# Patient Record
Sex: FEMALE | NOT HISPANIC OR LATINO | URBAN - METROPOLITAN AREA
[De-identification: names, ages, dates, MRNs, and addresses within clinical notes are randomized per-mention and may not be internally consistent; named-entity substitution may affect disease eponyms.]

---

## 2017-05-04 PROBLEM — C72.30: Status: ACTIVE | Noted: 2017-05-04

## 2022-08-16 ENCOUNTER — OFFICE VISIT (OUTPATIENT)
Dept: PEDIATRIC HEMATOLOGY/ONCOLOGY | Facility: CLINIC | Age: 37
End: 2022-08-16
Attending: PEDIATRICS
Payer: COMMERCIAL

## 2022-08-16 VITALS
WEIGHT: 138.89 LBS | TEMPERATURE: 99 F | HEART RATE: 85 BPM | SYSTOLIC BLOOD PRESSURE: 140 MMHG | OXYGEN SATURATION: 100 % | RESPIRATION RATE: 18 BRPM | DIASTOLIC BLOOD PRESSURE: 82 MMHG | HEIGHT: 62 IN | BODY MASS INDEX: 25.56 KG/M2

## 2022-08-16 DIAGNOSIS — Q85.01 NEUROFIBROMATOSIS, TYPE 1 (H): Primary | ICD-10-CM

## 2022-08-16 DIAGNOSIS — C72.30 BENIGN OPTIC PATHWAY GLIOMA (H): ICD-10-CM

## 2022-08-16 PROCEDURE — G0463 HOSPITAL OUTPT CLINIC VISIT: HCPCS

## 2022-08-16 PROCEDURE — 99204 OFFICE O/P NEW MOD 45 MIN: CPT | Performed by: PEDIATRICS

## 2022-08-16 RX ORDER — LABETALOL 100 MG/1
100 TABLET, FILM COATED ORAL 2 TIMES DAILY
COMMUNITY
Start: 2022-02-10

## 2022-08-16 ASSESSMENT — ENCOUNTER SYMPTOMS
RESPIRATORY NEGATIVE: 1
NEUROLOGICAL NEGATIVE: 1
CONSTIPATION: 1
MUSCULOSKELETAL NEGATIVE: 1
CONSTITUTIONAL NEGATIVE: 1
HEARTBURN: 1
CARDIOVASCULAR NEGATIVE: 1
EYES NEGATIVE: 1
PSYCHIATRIC NEGATIVE: 1

## 2022-08-16 ASSESSMENT — PAIN SCALES - GENERAL
PAINLEVEL: NO PAIN (0)
PAINLEVEL: NO PAIN (0)

## 2022-08-16 NOTE — PATIENT INSTRUCTIONS
Follow Up:  Return in March or 2023 for MRI and visit with Dr. Guerra  Call and schedule mammogram     Thank you for choosing [facility].    It was a pleasure to see you today.            Neurofibromatosis Team  Aquilino Guerra MD - Director, Neurofibromatosis/Pediatric Neuro-Oncology  Mari Fernandez MD - Pediatric Genetics    Lorenza Mejia, CNP, APRN  Jia Maki RN - NF Care Coordinator  Phone: [phone]  E-mail: [email or web address], [email or web address] Floor - [place] [id number] [facility] [place] [address]  Scheduling/Appointments: [phone]  Fax: [phone]     Numbers to call:   Monday - Friday, 8:00 am - 5:00 pm:  RN phone/voicemail: [phone]  Scheduling/Appointments: [phone]  Nights and weekends:   Call [phone] and ask the  to page the 'Pediatric Heme/Onc fellow on call' if you have an urgent concern that can't wait until the clinic opens.     Scheduling:    [facility], 9th floor [phone]  Infusion Center/Lab: [phone]   Radiology/ [phone]      Services:   [phone]         We encourage you to sign up for [employer] for easy communication with us.  Sign up at the clinic  or go to [email or web address].      Please try the Passport to [facility] ([facility]) phone application for Virtual Tours, Procedure Preparation, Resources, Preparation for Hospital Stay and the Coloring Board.     Other Instructions:  Ophthalmology (eye MD) exam every year  Annual physical with your Primary Care Provider  Influenza vaccine every year in the fall  Use Broad-Spectrum sunscreen SPF 15-30 from April through September  Full skin exam by dermatologist every 1-2 years.

## 2022-08-16 NOTE — PROGRESS NOTES
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 At that visit, the patient presented for evaluation of her NF1 before trying to get pregnant for a second time.     Today, the patient presents with her . She had a nodule removed from her face by Dr. Velazquez and is very happy with the results. The patient is taking Labetalol for her high blood pressure. Denies any other medications besides a daily probiotic. Of note, the patient had COVID-19 in December 2021 with long-lasting anosmia. This is improving with time. Denies any headaches, now or when her blood pressure was higher. The patient has noticed a few neurofibromas that have grown over the years but that they are mostly stable at this point in time.     Fam/soc: She is  with a 9 year old son.     The patient had a recent brain MRI on 3/6/22 at [facility]  IMPRESSION:  Brain:   1. Small 5 mm enhancing lesion with mild surrounding T2 hyperintensity in the left frontal white matter just anterior to the genu of the corpus callosum. Given the patient's history of NF1, this could represent a developing neoplasm. Recommend follow-up in 2-3 months.   2. Multiple scattered nonenhancing T2 hyperintense lesions within the brain parenchyma as above, likely secondary to the patient's history of NF1 and focal abnormal signal intensities.   Orbits:   1. Mild nonenhancing fusiform enlargement of the prechiasmatic right optic nerve, given the history of NF1 is suspicious for an optic nerve glioma.   2. There may be some atrophy of the posterior left optic nerve most pronounced along the posterior intraorbital and canalicular segments.           Review of Systems   Constitutional: Negative.    HENT: Negative.         Swallowing is sometimes difficult.    Eyes: Negative.    Respiratory: Negative.    Cardiovascular: Negative.    Gastrointestinal: Positive for constipation and heartburn.  "       Occasional heartburn after eating fatty foods. Occasional constipation, somewhat alleviated with probiotic.    Genitourinary: Negative.    Musculoskeletal: Negative.    Skin: Negative.    Neurological: Negative.    Endo/Heme/Allergies:        3-4 heavy days during her menses each month.    Psychiatric/Behavioral: Negative.    All other systems reviewed and are negative.      BP (!) 153/103 (BP Location: Left arm, Patient Position: Sitting, Cuff Size: Adult Regular)   Pulse 85   Temp 99  F (37.2  C) (Oral)   Resp 18   Ht 1.584 m (5' 2.36\")   Wt 63 kg (138 lb 14.2 oz)   SpO2 100%   BMI 25.11 kg/m      Physical Exam  Vitals reviewed.   Constitutional:       Appearance: Normal appearance. She is normal weight.   HENT:      Head: Normocephalic and atraumatic.      Right Ear: External ear normal.      Left Ear: External ear normal.      Nose: Nose normal.      Mouth/Throat:      Mouth: Mucous membranes are moist.      Pharynx: Oropharynx is clear.   Eyes:      Extraocular Movements: Extraocular movements intact.      Conjunctiva/sclera: Conjunctivae normal.      Pupils: Pupils are equal, round, and reactive to light.   Cardiovascular:      Rate and Rhythm: Normal rate and regular rhythm.      Pulses: Normal pulses.      Heart sounds: Normal heart sounds.   Pulmonary:      Effort: Pulmonary effort is normal.      Breath sounds: Normal breath sounds.   Abdominal:      General: Abdomen is flat. Bowel sounds are normal.      Palpations: Abdomen is soft.   Musculoskeletal:         General: Normal range of motion.      Cervical back: Normal range of motion and neck supple.   Skin:     General: Skin is warm and dry.      Comments: Cutaneous neurofibromas visualized throughout.   Neurological:      General: No focal deficit present.      Mental Status: She is alert and oriented to person, place, and time.   Psychiatric:         Mood and Affect: Mood normal.         Behavior: Behavior normal.         Thought Content: " Thought content normal.         Judgment: Judgment normal.         Impression:  1. NF1   2. High blood pressure  3. 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 Start taking vitamin D    2. Advised use of olive oil over butter in diet    3. Schedule a mammogram due to increased risk of breast cancer with NF1    4. Advised patient to be aware of headaches due to the possibility to blood vessel narrowing in the head. Also advised the patient to be aware of new pain, any new lump/bump that is growing quickly, and any pain that wakes her up from sleep.      F/U in March 2023 with an updated head MRI for follow up for her low grade glioma.     Total time spent on the following services on the date of the encounter:  Preparing to see patient, chart review, review of outside records, Referring or communicating with other healthcare professionals, Interpretation of labs, imaging and other tests, Performing a medically appropriate examination , Documenting clinical information in the electronic or other health record  and Total time spent: 46 minutes    BASHIR NEGRETE, am working as a scribe for and in the presence of Dr. Guerra on August 16, 2022. Dr. Guerra performed the services described in this note and the note is both complete and accurate.     Aquilino Guerra MD

## 2022-08-16 NOTE — NURSING NOTE
"Chief Complaint   Patient presents with     New Patient     Pt here for NF1     BP (!) 140/82 (BP Location: Left arm, Patient Position: Sitting, Cuff Size: Adult Regular)   Pulse 85   Temp 99  F (37.2  C) (Oral)   Resp 18   Ht 1.584 m (5' 2.36\")   Wt 63 kg (138 lb 14.2 oz)   SpO2 100%   BMI 25.11 kg/m      No Pain (0)  Data Unavailable    I have reviewed the patients medications and allergies    Height/weight double check needed? No    Peds Outpatient BP  1) Rested for 5 minutes, BP taken on bare arm, patient sitting (or supine for infants) w/ legs uncrossed?   Yes  2) Right arm used?  Left arm   No- Patient requested left arm  3) Arm circumference of largest part of upper arm (in cm): 27cm  4) BP cuff sized used: Adult (25-32cm)   If used different size cuff then what was recommended why? N/A  5) First BP reading:machine   BP Readings from Last 1 Encounters:   22 (!) 140/82      Is reading >90%?Yes   (90% for <1 years is 90/50)  (90% for >18 years is 140/90)  *If a machine BP is at or above 90% take manual BP  6) Manual BP readin/102 machine, 140/82 manual  7) Other comments: None          Rufus Melvin, EMT  2022  "

## 2022-08-16 NOTE — LETTER
8/16/2022      RE: Vika Jaquez  [address] [address]     Dear Colleague,    Thank you for the opportunity to participate in the care of your patient, Vika Jaquez, at the [facility] at [facility]. Please see a copy of my visit note below.    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 At that visit, the patient presented for evaluation of her NF1 before trying to get pregnant for a second time.     Today, the patient presents with her . She had a nodule removed from her face by Dr. Velazquez and is very happy with the results. The patient is taking Labetalol for her high blood pressure. Denies any other medications besides a daily probiotic. Of note, the patient had COVID-19 in December 2021 with long-lasting anosmia. This is improving with time. Denies any headaches, now or when her blood pressure was higher. The patient has noticed a few neurofibromas that have grown over the years but that they are mostly stable at this point in time.     Fam/soc: She is  with a 9 year old son.     The patient had a recent brain MRI on 3/6/22 at [facility]  IMPRESSION:  Brain:   1. Small 5 mm enhancing lesion with mild surrounding T2 hyperintensity in the left frontal white matter just anterior to the genu of the corpus callosum. Given the patient's history of NF1, this could represent a developing neoplasm. Recommend follow-up in 2-3 months.   2. Multiple scattered nonenhancing T2 hyperintense lesions within the brain parenchyma as above, likely secondary to the patient's history of NF1 and focal abnormal signal intensities.   Orbits:   1. Mild nonenhancing fusiform enlargement of the prechiasmatic right optic nerve, given the history of NF1 is suspicious for an optic nerve glioma.   2. There may be some  Problem: Diabetes  Goal: Glycemic balance achieved/maintained  Goal is to maintain blood sugar within range with no episodes of hypoglycemia   Outcome: Outcome Not Met, Plan Adjusted  Blood sugar control with lantus and sliding scale insulins. Sliding scale adjusted overnight per eICU for more adequate coverage with blood sugars remaining >200    Problem: Fluid Volume Excess, Risk for  Goal: # Absence of Rapid Weight Gain (no more than 2kg in 24 hours)  FVE Risk Patients may gain weight (but not more than 2 kg / 5#) but may not require aggressive treatment if in the absence of dyspnea; FVE (actual) patients should be monitored to achieve no weight gain.    Outcome: Outcome Met, Continue evaluating goal progress toward completion  Patient tolerating SLED for fluid removal. Patient is compliant with fluid restriction.       "atrophy of the posterior left optic nerve most pronounced along the posterior intraorbital and canalicular segments.           Review of Systems   Constitutional: Negative.    HENT: Negative.         Swallowing is sometimes difficult.    Eyes: Negative.    Respiratory: Negative.    Cardiovascular: Negative.    Gastrointestinal: Positive for constipation and heartburn.        Occasional heartburn after eating fatty foods. Occasional constipation, somewhat alleviated with probiotic.    Genitourinary: Negative.    Musculoskeletal: Negative.    Skin: Negative.    Neurological: Negative.    Endo/Heme/Allergies:        3-4 heavy days during her menses each month.    Psychiatric/Behavioral: Negative.    All other systems reviewed and are negative.      BP (!) 153/103 (BP Location: Left arm, Patient Position: Sitting, Cuff Size: Adult Regular)   Pulse 85   Temp 99  F (37.2  C) (Oral)   Resp 18   Ht 1.584 m (5' 2.36\")   Wt 63 kg (138 lb 14.2 oz)   SpO2 100%   BMI 25.11 kg/m      Physical Exam  Vitals reviewed.   Constitutional:       Appearance: Normal appearance. She is normal weight.   HENT:      Head: Normocephalic and atraumatic.      Right Ear: External ear normal.      Left Ear: External ear normal.      Nose: Nose normal.      Mouth/Throat:      Mouth: Mucous membranes are moist.      Pharynx: Oropharynx is clear.   Eyes:      Extraocular Movements: Extraocular movements intact.      Conjunctiva/sclera: Conjunctivae normal.      Pupils: Pupils are equal, round, and reactive to light.   Cardiovascular:      Rate and Rhythm: Normal rate and regular rhythm.      Pulses: Normal pulses.      Heart sounds: Normal heart sounds.   Pulmonary:      Effort: Pulmonary effort is normal.      Breath sounds: Normal breath sounds.   Abdominal:      General: Abdomen is flat. Bowel sounds are normal.      Palpations: Abdomen is soft.   Musculoskeletal:         General: Normal range of motion.      Cervical back: Normal range of " motion and neck supple.   Skin:     General: Skin is warm and dry.      Comments: Cutaneous neurofibromas visualized throughout.   Neurological:      General: No focal deficit present.      Mental Status: She is alert and oriented to person, place, and time.   Psychiatric:         Mood and Affect: Mood normal.         Behavior: Behavior normal.         Thought Content: Thought content normal.         Judgment: Judgment normal.         Impression:  1. NF1   2. High blood pressure  3. 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 Start taking vitamin D    2. Advised use of olive oil over butter in diet    3. Schedule a mammogram due to increased risk of breast cancer with NF1    4. Advised patient to be aware of headaches due to the possibility to blood vessel narrowing in the head. Also advised the patient to be aware of new pain, any new lump/bump that is growing quickly, and any pain that wakes her up from sleep.      F/U in March 2023 with an updated head MRI for follow up for her low grade glioma.     Total time spent on the following services on the date of the encounter:  Preparing to see patient, chart review, review of outside records, Referring or communicating with other healthcare professionals, Interpretation of labs, imaging and other tests, Performing a medically appropriate examination , Documenting clinical information in the electronic or other health record  and Total time spent: 46 minutes    BASHIR NEGRETE, am working as a scribe for and in the presence of Dr. Guerra on August 16, 2022. Dr. Guerra performed the services described in this note and the note is both complete and accurate.     Aquilino Guerra MD

## 2023-02-27 NOTE — PATIENT INSTRUCTIONS
Plan:  Return in 2 years to see Dr. Guerra       Thank you for choosing [facility].    It was a pleasure to see you today.            Neurofibromatosis Team  Aquilino Guerra MD - Director, Neurofibromatosis/Pediatric Neuro-Oncology  Mari Fernandez MD - Pediatric Genetics    Lorenza Mejia, ERLIN, APRN  Jia Maki RN - NF Care Coordinator  Phone: [phone]  E-mail: [email or web address], 9th Floor - [place] [id number] [place] [address]  Scheduling/Appointments: [phone]  Fax: [phone]     Numbers to call:   Monday - Friday, 8:00 am - 5:00 pm:  RN phone/voicemail: [phone]  Scheduling/Appointments: [phone]  Nights and weekends:   Call [phone] and ask the  to page the 'Pediatric Heme/Onc fellow on call' if you have an urgent concern that can't wait until the clinic opens.     Scheduling:    [facility], 9th floor [phone]  Infusion Center/Lab: [phone]   Radiology/ [phone]      Services:   [phone]         We encourage you to sign up for [employer] for easy communication with us.  Sign up at the clinic  or go to [email or web address].      Please try the Passport to [facility] ([facility]) phone application for Virtual Tours, Procedure Preparation, Resources, Preparation for Hospital Stay and the Coloring Board.     Other Instructions:  Ophthalmology (eye MD) exam every year  Annual physical with your Primary Care Provider  Influenza vaccine every year in the fall  Use Broad-Spectrum sunscreen SPF 15-30 from April through September  Full skin exam by dermatologist every 1-2 years.

## 2023-03-08 ENCOUNTER — HOSPITAL ENCOUNTER (OUTPATIENT)
Dept: MRI IMAGING | Facility: CLINIC | Age: 38
Discharge: HOME | End: 2023-03-08
Attending: PEDIATRICS
Payer: COMMERCIAL

## 2023-03-08 ENCOUNTER — OFFICE VISIT (OUTPATIENT)
Dept: PEDIATRIC HEMATOLOGY/ONCOLOGY | Facility: CLINIC | Age: 38
End: 2023-03-08
Attending: PEDIATRICS
Payer: COMMERCIAL

## 2023-03-08 VITALS
SYSTOLIC BLOOD PRESSURE: 120 MMHG | TEMPERATURE: 98.5 F | OXYGEN SATURATION: 97 % | WEIGHT: 137.57 LBS | DIASTOLIC BLOOD PRESSURE: 70 MMHG | HEIGHT: 62 IN | RESPIRATION RATE: 18 BRPM | BODY MASS INDEX: 25.32 KG/M2 | HEART RATE: 96 BPM

## 2023-03-08 DIAGNOSIS — C72.30 BENIGN OPTIC PATHWAY GLIOMA (H): ICD-10-CM

## 2023-03-08 DIAGNOSIS — Q85.01 NEUROFIBROMATOSIS, TYPE 1 (H): ICD-10-CM

## 2023-03-08 DIAGNOSIS — Q85.01 NEUROFIBROMATOSIS, TYPE 1 (H): Primary | ICD-10-CM

## 2023-03-08 PROCEDURE — G0463 HOSPITAL OUTPT CLINIC VISIT: HCPCS | Mod: 25 | Performed by: PEDIATRICS

## 2023-03-08 PROCEDURE — 255N000002 HC RX 255 OP 636: Performed by: PEDIATRICS

## 2023-03-08 PROCEDURE — A9585 GADOBUTROL INJECTION: HCPCS | Performed by: PEDIATRICS

## 2023-03-08 PROCEDURE — 70543 MRI ORBT/FAC/NCK W/O &W/DYE: CPT

## 2023-03-08 PROCEDURE — 99214 OFFICE O/P EST MOD 30 MIN: CPT | Mod: GC | Performed by: PEDIATRICS

## 2023-03-08 PROCEDURE — 70553 MRI BRAIN STEM W/O & W/DYE: CPT | Mod: 26 | Performed by: RADIOLOGY

## 2023-03-08 PROCEDURE — 70543 MRI ORBT/FAC/NCK W/O &W/DYE: CPT | Mod: 26 | Performed by: RADIOLOGY

## 2023-03-08 RX ORDER — GADOBUTROL 604.72 MG/ML
6.3 INJECTION INTRAVENOUS ONCE
Status: COMPLETED | OUTPATIENT
Start: 2023-03-08 | End: 2023-03-08

## 2023-03-08 RX ADMIN — GADOBUTROL 6.3 ML: 604.72 INJECTION INTRAVENOUS at 11:59

## 2023-03-08 ASSESSMENT — PAIN SCALES - GENERAL: PAINLEVEL: NO PAIN (0)

## 2023-03-08 ASSESSMENT — ENCOUNTER SYMPTOMS
EYES NEGATIVE: 1
CARDIOVASCULAR NEGATIVE: 1
RESPIRATORY NEGATIVE: 1
GASTROINTESTINAL NEGATIVE: 1
CONSTITUTIONAL NEGATIVE: 1
INSOMNIA: 1
MUSCULOSKELETAL NEGATIVE: 1
NEUROLOGICAL NEGATIVE: 1

## 2023-03-08 NOTE — DISCHARGE INSTRUCTIONS
MRI Contrast Discharge Instructions    The IV contrast you received today will pass out of your body in your  urine. This will happen in the next 24 hours. You will not feel this process.  Your urine will not change color.    Drink at least 4 extra glasses of water or juice today (unless your doctor  has restricted your fluids). This reduces the stress on your kidneys.  You may take your regular medicines.    If you are on dialysis: It is best to have dialysis today.    If you have a reaction: Most reactions happen right away. If you have  any new symptoms after leaving the hospital (such as hives or swelling),  call your hospital at the correct number below. Or call your family doctor.  If you have breathing distress or wheezing, call 911.    Special instructions: ***    I have read and understand the above information.    Signature:______________________________________ Date:___________    Staff:__________________________________________ Date:___________     Time:__________    [place] Radiology Departments:    ___[place]: [phone]  ___[facility]: [phone]  ___[place]: [phone] ___[facility]: [phone]  ___[facility]: [phone]  ___[facility]: [phone]  ___[phone]  ___[facility]: [phone]  ___[phone]

## 2023-03-08 NOTE — LETTER
3/8/2023      RE: Vika Jaquez  [address] [address]     Dear Colleague,    Thank you for the opportunity to participate in the care of your patient, Vika Jaquez, at the [facility] at [facility]. Please see a copy of my visit note below.    HPI  Vika Jaquez is a 38 year old year old with a history of NF1 who presents to the clinic for a follow up, last seen in our clinic on 8/16/22. Accompanied by her .    Patient reports that she has been doing well since the last visit, but notes that she wakes up frequently through out the night which leads to disruptive sleep. She has 2 cans of soda a day and does not drink alcohol. She believes that sleep disruption is related to stress related to work.    Patient had her last mammogram 6 months ago which came back normal.     Patient notes a soft, squishy nodule over her left thigh without pain or decrease in function.    Fam/soc: Patient works with pre-schoolers.     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 Although, she had cutaneous facial neurofibromas, she was not diagnosed until later after she had a nodule removed from her face that was found to be a neurofibroma. 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      Review of Systems   Constitutional: Negative.    HENT: Negative.    Eyes: Negative.    Respiratory: Negative.    Cardiovascular: Negative.    Gastrointestinal: Negative.    Genitourinary: Negative.    Musculoskeletal: Negative.    Skin: Negative.    Neurological: Negative.    Psychiatric/Behavioral: The patient has insomnia.         Wakes up multiple time throughout the night leading to disrupted sleep.   All other systems reviewed and are negative.      Objective  /70 (BP  "Location: Left arm, Patient Position: Sitting, Cuff Size: Adult Regular)   Pulse 96   Temp 98.5  F (36.9  C) (Oral)   Resp 18   Ht 1.582 m (5' 2.28\")   Wt 62.4 kg (137 lb 9.1 oz)   SpO2 97%   BMI 24.93 kg/m    Physical Exam  Vitals reviewed. Exam conducted with a chaperone present.   Constitutional:       Appearance: Normal appearance. She is normal weight.   HENT:      Head: Normocephalic and atraumatic.      Right Ear: External ear normal.      Left Ear: External ear normal.      Nose: Nose normal.      Mouth/Throat:      Mouth: Mucous membranes are moist.      Pharynx: Oropharynx is clear.   Eyes:      Extraocular Movements: Extraocular movements intact.      Pupils: Pupils are equal, round, and reactive to light.   Cardiovascular:      Rate and Rhythm: Normal rate and regular rhythm.      Pulses: Normal pulses.      Heart sounds: Normal heart sounds.   Pulmonary:      Effort: Pulmonary effort is normal.      Breath sounds: Normal breath sounds.   Musculoskeletal:         General: Normal range of motion.      Cervical back: Normal range of motion and neck supple.   Skin:     Comments: Stigmata of NF1. Soft sub dermal mass of the left upper shin.    Neurological:      General: No focal deficit present.      Mental Status: She is alert and oriented to person, place, and time. Mental status is at baseline.   Psychiatric:         Mood and Affect: Mood normal.         Behavior: Behavior normal.         Thought Content: Thought content normal.         Judgment: Judgment normal.       Results for orders placed or performed during the hospital encounter of 03/08/23   MRI Brain and orbits     Status: None    Narrative    EXAM: MR BRAIN AND ORBITS W CONTRAST  3/8/2023 12:16 PM     HISTORY:  Neurofibromatosis, type 1 (H); Benign optic pathway glioma  (H)       COMPARISON:  3/6/2022    TECHNIQUE: 1. MRI of the Brain:  Sagittal T1-weighted, axial  turboFLAIR and axial diffusion-weighted with ADC map images of " the  brain were obtained without intravenous contrast.  After intravenous  administration of gadolinium, axial T1-weighted images of the brain  were obtained.    2. MRI of the Orbits focused on the orbits/visual pathways:  Axial  T2-weighted with inversion recovery and coronal T1-weighted images  were obtained without intravenous contrast. Axial and coronal  T1-weighted images with fat saturation were obtained after intravenous  gadolinium administration.    CONTRAST: 6.3 mL of Louis.    FINDINGS:    Mild fusiform enlargement of the prechiasmatic right optic nerve  measuring up to 4 mm, previously 4 mm. This region does not enhance.  No new regions of focal enlargement of the optic nerves.     Scattered foci of T2/FLAIR hyperintensity in the left greater than  right basal ganglia, medial thalami, and brainstem which are not  significantly changed from prior exam. There is a small focus of T2  hyperintensity in the left frontal white matter immediately anterior  to the corpus callosum which displays enhancement. This measures 5 x 5  mm, previously 5 x 5 mm. No new foci of abnormal intracranial  enhancement. No abnormal restricted diffusion. No abnormal restricted  diffusion. Major intracranial vascular flow voids appear patent.    Visualized paranasal sinuses and mastoid air cells are clear.      Impression    IMPRESSION:    1. Unchanged scattered focal areas of signal intensity compared to  prior compatible with given history of neurofibromatosis type I.  2. Unchanged fusiform enlargement of the right prechiasmatic optic  nerve compatible with optic nerve glioma.  3. Unchanged small enhancing left frontal lobe lesion which may  represent small glioma. Continued attention on follow-up recommended.    I have personally reviewed the examination and initial interpretation  and I agree with the findings.    LEILANI PAREDES MD         SYSTEM ID:  [id number]      Impression:  NF1  No concerns on follow up MRI  Plexiform  neurofibroma left leg      Plan  1. Annual exam: Assess skin lesions, monitor for hypertension, evaluate for skeletal changes (at risk for vertebral body changes and osteoporosis)  2. This is a cancer predisposition syndrome; concerning findings should prompt evaluation. Continue annual mammography screening. Red flag for MPNST include rapid plexiform growth, significant pain, new neurologic deficit, and change to a hard texture  3. At risk for vasculopathy, elevated risk of stroke. Blood pressure today is normal.   4. Neurocognitive evaluation / assistance: N/A.  5. Follow up in 2 years    SignedSunil   Pediatric Neuro Oncology Fellow    Physician Attestation   I, Aquilino Guerra MD, saw this patient and agree with the findings and plan of care as documented in the note.      Items personally reviewed/procedural attestation: vitals and imaging and agree with the interpretation documented in the note.    Aquilino Guerra MD      Total time spent on the following services on the date of the encounter:  Preparing to see patient, chart review, review of outside records, Referring or communicating with other healthcare professionals, Interpretation of labs, imaging and other tests, Performing a medically appropriate examination , Documenting clinical information in the electronic or other health record  and Total time spent: 30 minutes    IReal, am working as a scribe for and in the presence of Dr. Guerra on March 8, 2023. Dr. Guerra performed the services described in this note and the note is both complete and accurate.     Aquilino Guerra MD

## 2023-03-08 NOTE — PROGRESS NOTES
"HPI  Vika Jaquez is a 38 year old year old with a history of NF1 who presents to the clinic for a follow up, last seen in our clinic on 8/16/22. Accompanied by her .    Patient reports that she has been doing well since the last visit, but notes that she wakes up frequently through out the night which leads to disruptive sleep. She has 2 cans of soda a day and does not drink alcohol. She believes that sleep disruption is related to stress related to work.    Patient had her last mammogram 6 months ago which came back normal.     Patient notes a soft, squishy nodule over her left thigh without pain or decrease in function.    Fam/soc: Patient works with pre-schoolers.     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 Although, she had cutaneous facial neurofibromas, she was not diagnosed until later after she had a nodule removed from her face that was found to be a neurofibroma. 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      Review of Systems   Constitutional: Negative.    HENT: Negative.    Eyes: Negative.    Respiratory: Negative.    Cardiovascular: Negative.    Gastrointestinal: Negative.    Genitourinary: Negative.    Musculoskeletal: Negative.    Skin: Negative.    Neurological: Negative.    Psychiatric/Behavioral: The patient has insomnia.         Wakes up multiple time throughout the night leading to disrupted sleep.   All other systems reviewed and are negative.      Objective  /70 (BP Location: Left arm, Patient Position: Sitting, Cuff Size: Adult Regular)   Pulse 96   Temp 98.5  F (36.9  C) (Oral)   Resp 18   Ht 1.582 m (5' 2.28\")   Wt 62.4 kg (137 lb 9.1 oz)   SpO2 97%   BMI 24.93 kg/m    Physical Exam  Vitals reviewed. Exam conducted with a chaperone present.   Constitutional:       Appearance: Normal appearance. She is normal weight. "   HENT:      Head: Normocephalic and atraumatic.      Right Ear: External ear normal.      Left Ear: External ear normal.      Nose: Nose normal.      Mouth/Throat:      Mouth: Mucous membranes are moist.      Pharynx: Oropharynx is clear.   Eyes:      Extraocular Movements: Extraocular movements intact.      Pupils: Pupils are equal, round, and reactive to light.   Cardiovascular:      Rate and Rhythm: Normal rate and regular rhythm.      Pulses: Normal pulses.      Heart sounds: Normal heart sounds.   Pulmonary:      Effort: Pulmonary effort is normal.      Breath sounds: Normal breath sounds.   Musculoskeletal:         General: Normal range of motion.      Cervical back: Normal range of motion and neck supple.   Skin:     Comments: Stigmata of NF1. Soft sub dermal mass of the left upper shin.    Neurological:      General: No focal deficit present.      Mental Status: She is alert and oriented to person, place, and time. Mental status is at baseline.   Psychiatric:         Mood and Affect: Mood normal.         Behavior: Behavior normal.         Thought Content: Thought content normal.         Judgment: Judgment normal.       Results for orders placed or performed during the hospital encounter of 03/08/23   MRI Brain and orbits     Status: None    Narrative    EXAM: MR BRAIN AND ORBITS W CONTRAST  3/8/2023 12:16 PM     HISTORY:  Neurofibromatosis, type 1 (H); Benign optic pathway glioma  (H)       COMPARISON:  3/6/2022    TECHNIQUE: 1. MRI of the Brain:  Sagittal T1-weighted, axial  turboFLAIR and axial diffusion-weighted with ADC map images of the  brain were obtained without intravenous contrast.  After intravenous  administration of gadolinium, axial T1-weighted images of the brain  were obtained.    2. MRI of the Orbits focused on the orbits/visual pathways:  Axial  T2-weighted with inversion recovery and coronal T1-weighted images  were obtained without intravenous contrast. Axial and coronal  T1-weighted  images with fat saturation were obtained after intravenous  gadolinium administration.    CONTRAST: 6.3 mL of Louis.    FINDINGS:    Mild fusiform enlargement of the prechiasmatic right optic nerve  measuring up to 4 mm, previously 4 mm. This region does not enhance.  No new regions of focal enlargement of the optic nerves.     Scattered foci of T2/FLAIR hyperintensity in the left greater than  right basal ganglia, medial thalami, and brainstem which are not  significantly changed from prior exam. There is a small focus of T2  hyperintensity in the left frontal white matter immediately anterior  to the corpus callosum which displays enhancement. This measures 5 x 5  mm, previously 5 x 5 mm. No new foci of abnormal intracranial  enhancement. No abnormal restricted diffusion. No abnormal restricted  diffusion. Major intracranial vascular flow voids appear patent.    Visualized paranasal sinuses and mastoid air cells are clear.      Impression    IMPRESSION:    1. Unchanged scattered focal areas of signal intensity compared to  prior compatible with given history of neurofibromatosis type I.  2. Unchanged fusiform enlargement of the right prechiasmatic optic  nerve compatible with optic nerve glioma.  3. Unchanged small enhancing left frontal lobe lesion which may  represent small glioma. Continued attention on follow-up recommended.    I have personally reviewed the examination and initial interpretation  and I agree with the findings.    LEILANI PAREDES MD         SYSTEM ID:  [id number]      Impression:  NF1  No concerns on follow up MRI  Plexiform neurofibroma left leg      Plan  1. Annual exam: Assess skin lesions, monitor for hypertension, evaluate for skeletal changes (at risk for vertebral body changes and osteoporosis)  2. This is a cancer predisposition syndrome; concerning findings should prompt evaluation. Continue annual mammography screening. Red flag for MPNST include rapid plexiform growth, significant  pain, new neurologic deficit, and change to a hard texture  3. At risk for vasculopathy, elevated risk of stroke. Blood pressure today is normal.   4. Neurocognitive evaluation / assistance: N/A.  5. Follow up in 2 years    Sunli Zepeda   Pediatric Neuro Oncology Fellow    Physician Attestation   I, Aquilino Guerra MD, saw this patient and agree with the findings and plan of care as documented in the note.      Items personally reviewed/procedural attestation: vitals and imaging and agree with the interpretation documented in the note.    Aquilino Guerra MD      Total time spent on the following services on the date of the encounter:  Preparing to see patient, chart review, review of outside records, Referring or communicating with other healthcare professionals, Interpretation of labs, imaging and other tests, Performing a medically appropriate examination , Documenting clinical information in the electronic or other health record  and Total time spent: 30 minutes    IReal, am working as a scribe for and in the presence of Dr. Guerra on March 8, 2023. Dr. Guerra performed the services described in this note and the note is both complete and accurate.     Aquilino Guerra MD

## 2023-03-08 NOTE — NURSING NOTE
"Chief Complaint   Patient presents with     RECHECK     Pt here for NF1 follow-up      /70 (BP Location: Left arm, Patient Position: Sitting, Cuff Size: Adult Regular)   Pulse 96   Temp 98.5  F (36.9  C) (Oral)   Resp 18   Ht 1.582 m (5' 2.28\")   Wt 62.4 kg (137 lb 9.1 oz)   SpO2 97%   BMI 24.93 kg/m      No Pain (0)  Data Unavailable    I have reviewed the patients medications and allergies    Height/weight double check needed? No    Peds Outpatient BP  1) Rested for 5 minutes, BP taken on bare arm, patient sitting (or supine for infants) w/ legs uncrossed?   Yes  2) Right arm used?  Left arm   No- Patient requested left arm  3) Arm circumference of largest part of upper arm (in cm): 27cm  4) BP cuff sized used: Adult (25-32cm)   If used different size cuff then what was recommended why? N/A  5) First BP reading:machine   BP Readings from Last 1 Encounters:   03/08/23 120/70      Is reading >90%?No   (90% for <1 years is 90/50)  (90% for >18 years is 140/90)  *If a machine BP is at or above 90% take manual BP  6) Manual BP reading: N/A  7) Other comments: None          Rufus Melvin, EMT  March 8, 2023  "